# Patient Record
Sex: MALE | Race: WHITE | NOT HISPANIC OR LATINO | URBAN - METROPOLITAN AREA
[De-identification: names, ages, dates, MRNs, and addresses within clinical notes are randomized per-mention and may not be internally consistent; named-entity substitution may affect disease eponyms.]

---

## 2018-02-16 ENCOUNTER — AMBULATORY - HEALTHEAST (OUTPATIENT)
Dept: ADMINISTRATIVE | Facility: REHABILITATION | Age: 68
End: 2018-02-16

## 2018-02-16 ENCOUNTER — OFFICE VISIT - HEALTHEAST (OUTPATIENT)
Dept: PHYSICAL THERAPY | Facility: REHABILITATION | Age: 68
End: 2018-02-16

## 2018-02-16 DIAGNOSIS — M25.551 RIGHT HIP PAIN: ICD-10-CM

## 2018-02-16 DIAGNOSIS — M79.18 RIGHT BUTTOCK PAIN: ICD-10-CM

## 2018-02-20 ENCOUNTER — OFFICE VISIT - HEALTHEAST (OUTPATIENT)
Dept: PHYSICAL THERAPY | Facility: REHABILITATION | Age: 68
End: 2018-02-20

## 2018-02-20 DIAGNOSIS — M25.551 RIGHT HIP PAIN: ICD-10-CM

## 2018-02-20 DIAGNOSIS — M79.18 RIGHT BUTTOCK PAIN: ICD-10-CM

## 2018-02-22 ENCOUNTER — OFFICE VISIT - HEALTHEAST (OUTPATIENT)
Dept: PHYSICAL THERAPY | Facility: REHABILITATION | Age: 68
End: 2018-02-22

## 2018-02-22 DIAGNOSIS — M79.18 RIGHT BUTTOCK PAIN: ICD-10-CM

## 2018-02-22 DIAGNOSIS — M25.551 RIGHT HIP PAIN: ICD-10-CM

## 2021-06-16 NOTE — PROGRESS NOTES
[facility] Daily Progress     Patient Name: Ej Watkins  Date: 2018  Visit #: 3/6-12   auth 18  PTA visit #:  3  Referral Diagnosis: right hip pain  Referring provider: Sergio Vieyra MD  Visit Diagnosis:     ICD-10-CM    1. Right hip pain M25.551    2. Right buttock pain M79.1          Assessment:   Cues for HEP/posture needed   Complaint with HEP  Pt would  Benefit with continuing with skilled PT treatments      Goal Status: Ongoing  Pt. will demonstrate/verbalize independence in self-management of condition in : 6 weeks  Pt. will be independent with home exercise program in : 6 weeks  Patient will sit: 60 minutes;for driving;with less pain;with less difficultty;in 6 weeks  No Data Recorded    Plan / Patient Education:     Continue POC  Progress ex per flow sheet   all fours  See in two weeks    Subjective:     Pain Ratin/10  Feel better with ex  Pt reports drove for half hour yesterday without increased pain  Going to [place] for two weeks        Objective:       Reports decreased pain with driving  Reviewed  clams, bridging with knee ext, prone knee bends, glut sets, leg lifts  Added press ups, standing ext, and sidelying hip ABD  Tolerated treatment well    Treatment Today   2018    TREATMENT MINUTES COMMENTS   Evaluation     Self-care/ Home management     Manual therapy 9 MFR to lumbar paraspinals   Neuromuscular Re-education     Therapeutic Activity     Therapeutic Exercises 16 Ex per flow sheet   Gait training     Modality__________________                Total 25    Blank areas are intentional and mean the treatment did not include these items.       Wendy Miranda  2018

## 2021-06-16 NOTE — PROGRESS NOTES
[facility]   Hip Initial Evaluation    Patient Name: Ej Watkins  Date of evaluation: 2/16/2018  Visit #1  Referral Diagnosis:  (R) hip pain  Referring provider: Sergio Vieyra MD  Visit Diagnosis:     ICD-10-CM    1. Right hip pain M25.551    2. Right buttock pain M79.1        Assessment:     Ej Watkins is a 67 y.o. male who presents to therapy today with chief complaints of (R) hip/buttock pain. Onset date of sx was 1/18.  Functional impairments include sitting, driving.  Clinical findings include limited balance with SLS, no symptom reproduction with trunk ROM,hip ROM limitations in IR, no pain with resisted testing, tenderness of (R) post hip/pelvis .          Pt. is appropriate for skilled PT intervention as outlined in the Plan of Care (POC).  Pt. is a good candidate for skilled PT services to improve pain levels and function.    Goals:  Pt. will demonstrate/verbalize independence in self-management of condition in : 6 weeks  Pt. will be independent with home exercise program in : 6 weeks  Patient will sit: 60 minutes;for driving;with less pain;with less difficultty;in 6 weeks  No Data Recorded    Patient's expectations/goals are realistic.    Barriers to Learning or Achieving Goals:  No Barriers.       Plan / Patient Instructions:        Plan of Care:   Authorization / Certification Start Date: 02/16/18  Authorization / Certification End Date: 05/14/18  Authorization / Certification Number of Visits: HP/MC  Communication with: Referral Source  Patient Related Instruction: Nature of Condition;Treatment plan and rationale;Self Care instruction;Basis of treatment;Body mechanics;Posture;Next steps  Times per Week: 1-2  Number of Weeks: 6-12  Number of Visits: up to 12  Discharge Planning: HEP, self management  Precautions / Restrictions : none  Therapeutic Exercise: ROM;Stretching;Strengthening  Neuromuscular Reeducation: posture;core;balance/proprioception  Manual Therapy: myofascial  release;strain counterstrain    Plan for next visit: progression of home program, manual therapy     Subjective:        Social information:   Living Situation:lives with others    Occupation:retired   Work Status:NA   Equipment Available: None    History of Present Illness:    Ej is a 67 y.o. male who presents to therapy today with complaints of (R) post hip/buttock pain. Date of onset/duration of symptoms is . Onset was gradual. Symptoms are intermittent and getting worse. He denies history of similar symptoms. He describes their previous level of function as not limited  Pain with driving/sitting > 30 min. Mild discomfort on the treadmill this morning. Walks 3 mi/day. No pain with walking, standing. He denies any current back problems.   He has fallen 2x in the past year - 1x due to lightheadedness, tripped another time. He is cautious on the ice. He reports some concerns about balance, uses handrail on stairs etc.   X rays negative.    He is flying to [place] next week and is concerned about pain during flight.     Pain Ratin  Pain rating at best: 0  Pain rating at worst: 5  Pain description: pain    Functional limitations are described as occurring with:   sitting 30 min  driving    Patient reports benefit from:  movement or exercise        Objective:      Note: Items left blank indicates the item was not performed or not indicated at the time of the evaluation.    Patient Outcome Measures :    Lower Extremity Functional Scale (_/80): 77   Scores range from 0-80, where a score of 80 represents maximum function. The minimal clinically important difference is a positive change of 9 points.  APTA score: 9    Hip Examination  1. Right hip pain     2. Right buttock pain       Involved Side: Right  Posture Observation:      Lumbopelvic complex: Moderately decreased lumbar lordosis  Pelvic alignment: (R) PSIS post in standing  Assistive Device: None  Gait Observation: na  Single leg balance: 5 sec (R), 9 sec  (L)  Partial squat: no pain  Lumbar Clearing: Does not provoke symptoms    Hip ROM:    Date: 2/16/18     Hip ROM( ) AROM in degrees AROM in degrees AROM in degrees    Right Left Right Left Right Left   Hip Flexion (0-120 )         Hip Abduction (0-45 )         Hip External Rotation (0-50 )         Hip Internal Rotation (0-40 )         Hip Extension (0-15 )          PROM in degrees PROM in degrees PROM in degrees    Right Left Right Left Right Left   Hip Flexion (0-120 ) 101        Hip Abduction (0-45 ) 37        Hip External Rotation (0-50 ) WNL        Hip Internal Rotation (0-40 ) 23        Hip Extension (0-15 )           Hip/Knee Strength     Date:      Hip/Knee Strength (/5) MMT MMT MMT    Right Left Right Left Right Left   Hip Flexion 5        Hip Abduction 5        Hip Adduction         Hip Extension         Hip External Rotation 5        Hip Internal Rotation 4+        Knee Extension         Knee Flexion             Hip Special Tests     OA Right (+/-) Left (+/-) Intra Articular Right (+/-) Left (+/-)   Hip Scour   JUSTUS WNL    Test Cluster  -Hip pain  -Hip IR <15   -Hip Flex <115  neg  FADIR Mild/mod tightness    Test Cluster  -Painful Hip IR  ->50 years old  -Morning Stiffness <60 min neg  Passive Supine Rotation Test     Misc. Right (+/-) Left (+/-) Stinchfield Test (SLR Against Resistance)     Ely s   DEXRIT     Yandel s   DIRI     Trendelenburg   Posterior Rim Impingement     SIJ Right (+/-) Left (+/-) Lateral Rim Impingement     SIJ Compression   Other SLR WNL/neg    SIJ Distraction   OtherSlump neg    POSH Test   Other     Sacral Thrust   Other         Flexibility:    Palpation: tender points noted (R) post gr. Trochanter, (R) gluteals/piriformis.     Treatment Today     TREATMENT MINUTES COMMENTS   Evaluation 35    Self-care/ Home management     Manual therapy 10 Induction, indirect, direct techniques utilized as appropriate for optimal tissue release.   MFR/SCS - (R) PUD-A, (R) PLT, (R) pir,     Neuromuscular Re-education     Therapeutic Activity     Therapeutic Exercises 10 Plan of care and goals developed in collaboration with patient.   Discussed findings,  instructed in exercises.   Gait training     Modality__________________                Total 55    Blank areas are intentional and mean the treatment did not include these items.     PT Evaluation Code: (Please list factors)  Patient History/Comorbidities: hx prostate ca/radiation  Examination: 2  Clinical Presentation: stable  Clinical Decision Making: low    Patient History/  Comorbidities Examination  (body structures and functions, activity limitations, and/or participation restrictions) Clinical Presentation Clinical Decision Making (Complexity)   No documented Comorbidities or personal factors 1-2 Elements Stable and/or uncomplicated Low   1-2 documented comorbidities or personal factor 3 Elements Evolving clinical presentation with changing characteristics Moderate   3-4 documented comorbidities or personal factors 4 or more Unstable and unpredictable High                Hortencia Hsu  2/16/2018  1:28 PM

## 2021-06-16 NOTE — PROGRESS NOTES
[facility] Daily Progress     Patient Name: Ej Watkins  Date: 2018  Visit #: -12   auth 18  PTA visit #:  1  Referral Diagnosis: right hip pain  Referring provider: Sergio Vieyra MD  Visit Diagnosis:     ICD-10-CM    1. Right hip pain M25.551    2. Right buttock pain M79.1          Assessment:   Cues for HEP/posture needed   Complaint with HEP  Pt would  Benefit with continuing with skilled PT treatments      Goal Status: Ongoing  Pt. will demonstrate/verbalize independence in self-management of condition in : 6 weeks  Pt. will be independent with home exercise program in : 6 weeks  Patient will sit: 60 minutes;for driving;with less pain;with less difficultty;in 6 weeks  No Data Recorded    Plan / Patient Education:     Continue POC  Progress ex per flow sheet  Press ups, all fours    Subjective:     Pain Ratin/10  Feel better with ex  Pain in right hip /buttock with sitting>hour  Ex 2x/day walk on treadmill each day  Sleeping is fine        Objective:     Cues for HEP/posture  Reviewed HEP  Added clams, bridging with knee ext, prone knee bends, glut sets, leg lifts  Instruction in self massage with tennis ball to right buttocks  Tolerated treatment well    Treatment Today   2018    TREATMENT MINUTES COMMENTS   Evaluation     Self-care/ Home management     Manual therapy 5 MFR to lumbar paraspinals   Neuromuscular Re-education     Therapeutic Activity     Therapeutic Exercises 20 Ex per flow sheet   Gait training     Modality__________________                Total 25    Blank areas are intentional and mean the treatment did not include these items.       Wendy Miranda  2018

## 2021-06-16 NOTE — PROGRESS NOTES
[facility] Discharge Summary  Patient Name: Ej Watkins  Date: 3/12/2018   Referral Diagnosis: Right hip pain  Referring provider: Sergio Vieyra MD  Visit Diagnosis:   1. Right hip pain     2. Right buttock pain         Goals:  Pt. will demonstrate/verbalize independence in self-management of condition in : 6 weeks  Pt. will be independent with home exercise program in : 6 weeks  Patient will sit: 60 minutes;for driving;with less pain;with less difficultty;in 6 weeks  No Data Recorded    Patient was seen for 3 visits from 2/16/18 to 2/22/18 with no missed appointments.  The patient met goals and has demonstrated understanding of and independence in the home program for self-care, and progression to next steps.  He will initiate contact if questions or concerns arise.  The patient reports feeling better and did not wish to schedule further therapy at this time.  Patient received a home program for stabilization, flexibility.   No further therapy is required at this time.  Patient completed trip to [place] without pain and doesn't feel he needs continued treatment.     Therapy will be discontinued at this time.  The patient will need a new referral to resume.    Thank you for your referral.  Hortencia Hsu  3/12/2018  1:14 PM